# Patient Record
Sex: FEMALE | Race: BLACK OR AFRICAN AMERICAN | NOT HISPANIC OR LATINO | URBAN - METROPOLITAN AREA
[De-identification: names, ages, dates, MRNs, and addresses within clinical notes are randomized per-mention and may not be internally consistent; named-entity substitution may affect disease eponyms.]

---

## 2020-12-09 ENCOUNTER — IMPORTED ENCOUNTER (OUTPATIENT)
Dept: URBAN - METROPOLITAN AREA CLINIC 1 | Facility: CLINIC | Age: 70
End: 2020-12-09

## 2020-12-09 PROBLEM — H26.493: Noted: 2020-12-09

## 2020-12-09 PROBLEM — Z96.1: Noted: 2020-12-09

## 2020-12-09 PROBLEM — H40.1131: Noted: 2020-12-09

## 2020-12-09 PROCEDURE — 92133 CPTRZD OPH DX IMG PST SGM ON: CPT

## 2020-12-09 PROCEDURE — 92020 GONIOSCOPY: CPT

## 2020-12-09 PROCEDURE — 92004 COMPRE OPH EXAM NEW PT 1/>: CPT

## 2020-12-09 NOTE — PATIENT DISCUSSION
1.  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 IOP 29/19 today. Cont Brimonidine BID OU Dozolamide-Timolol BID OD Latanoprost QHS OU. D/c Dorzolamide 2%. Patient advised to be compliant with gtts. Condition was discussed with patient and patient understands. Will continue to monitor patient for any progression in condition. Patient was advised to call us with any problems questions or concerns. Gonio today OU: OD shows broad PAS in the inferior and nasal angle w/ 1 TM pigment and a patent superior LPI OS shows broad PAS in the superior and temporal angle w/ trace TM pigment and a patent superior LPI. ***Consider bearveldt tube shunt in future if IOP OD elevated. 2. PCO OU: (Posterior Capsule Opacification):  Observe and consider yag cap when pt feels pco visually significant and visual acuity decreases to appropriate level. 3. Pseudophakia OU - done elsewhere4. H/o Chronic iritis - Quiet today OU. Pt states currently taking Pred BID OD. Recommend Pred TID OD x 1 week then BID OD x 1 week the Qdaily OD x 1 week if iritis remain quiet. ***Consider d/c'ing Pred at next visit if iritis quiet. Patient deferred Manifest Rx today. Return for an appointment in 3 weeks 10 with Dr. Jackie Cisneros.

## 2020-12-30 ENCOUNTER — IMPORTED ENCOUNTER (OUTPATIENT)
Dept: URBAN - METROPOLITAN AREA CLINIC 1 | Facility: CLINIC | Age: 70
End: 2020-12-30

## 2020-12-30 PROBLEM — H40.1131: Noted: 2020-12-30

## 2020-12-30 PROCEDURE — 92012 INTRM OPH EXAM EST PATIENT: CPT

## 2020-12-30 NOTE — PATIENT DISCUSSION
1.  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 IOP 39/15 today. Cont Brimonidine BID OU Dozolamide-Timolol BID OD Latanoprost QHS OU. Patient advised to be compliant with gtts. Condition was discussed with patient and patient understands. Will continue to monitor patient for any progression in condition. Patient was advised to call us with any problems questions or concerns. Discussed the  risks benefits and details of a bearveldt tube shunt procedure OD. Pt wished to proceed with bearveldt tube shunt. Will schedule bearveldt tube shunt procedure for OD only. 2.  PCO OU: (Posterior Capsule Opacification):  Observe and consider yag cap when pt feels pco visually significant and visual acuity decreases to appropriate level. 3. Pseudophakia OU - done elsewhere4. H/o Chronic iritis - Quiet today OU. Taper Pred to BID OU. ***Consider tapering further in the future if iritis remains quietReturn for an appointment in [address] tube shunt procedure OD with Dr. Daysi Meek.

## 2021-01-13 ENCOUNTER — IMPORTED ENCOUNTER (OUTPATIENT)
Dept: URBAN - METROPOLITAN AREA CLINIC 1 | Facility: CLINIC | Age: 71
End: 2021-01-13

## 2021-01-13 PROBLEM — H20.011: Noted: 2021-01-13

## 2021-01-13 PROCEDURE — 92012 INTRM OPH EXAM EST PATIENT: CPT

## 2021-01-13 NOTE — PATIENT DISCUSSION
1.  Chronic Iritis OU OD>OS -- Cells noted OD on Pred TID OD. Given IOP increased decrease Pred BID OD until patient seen. 2.  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 Cont Brimonidine BID OU Latanoprost QHS OU and  Dozolamide-Timolol BID OD Only. Patient advised to be compliant with gtts. Condition was discussed with patient and patient understands. Will continue to monitor patient for any progression in condition. Patient was advised to call us with any problems questions or concerns. Discussed the  risks benefits and details of a Bearveldt tube shunt procedure OD. Pt wished to proceed with Bearveldt tube shunt. Will schedule Bearveldt tube shunt procedure for OD only. 3.  PCO OU -- (Posterior Capsule Opacification):  Observe and consider yag cap when pt feels pco visually significant and visual acuity decreases to appropriate level. 4. Pseudophakia OU -- Done elsewhere. Return for an appointment in [place] tube shunt procedure OD with Dr. Aaron Felder.

## 2021-01-27 ENCOUNTER — IMPORTED ENCOUNTER (OUTPATIENT)
Dept: URBAN - METROPOLITAN AREA CLINIC 1 | Facility: CLINIC | Age: 71
End: 2021-01-27

## 2021-01-27 PROBLEM — H40.41X1: Noted: 2021-01-27

## 2021-01-27 NOTE — PATIENT DISCUSSION
Mild Uveitic Glaucoma OD (CD 0.20)- IOP remains elevated despite compliance and maximum gtt therapy. Will proceed with Baerveldt Tube Shunt OD as scheduled. Pros cons risks and benefits discussed. Cont Brimonidine BID OU Latanoprost QHS OU and  Dozolamide-Timolol BID OD Only. Return for an appointment for Return as scheduled with Dr. Mary Velázquez.

## 2021-02-10 ENCOUNTER — IMPORTED ENCOUNTER (OUTPATIENT)
Dept: URBAN - METROPOLITAN AREA CLINIC 1 | Facility: CLINIC | Age: 71
End: 2021-02-10

## 2021-02-11 ENCOUNTER — IMPORTED ENCOUNTER (OUTPATIENT)
Dept: URBAN - METROPOLITAN AREA CLINIC 1 | Facility: CLINIC | Age: 71
End: 2021-02-11

## 2021-02-11 PROCEDURE — 99024 POSTOP FOLLOW-UP VISIT: CPT

## 2021-02-11 NOTE — PATIENT DISCUSSION
POD#1 Baerveldt Shunt OD - (PVM)  Continue Pred QID OD Ofloxacin QID OD and Maxitrol QHS OD. Restart Dorzolamide Timolol TID OD and Brimonodine TID OD. Begin Diamox 250mg 1 PO BID. Return for an appointment in tomorrow po with Dr. Elliott Avalos.

## 2021-02-12 ENCOUNTER — IMPORTED ENCOUNTER (OUTPATIENT)
Dept: URBAN - METROPOLITAN AREA CLINIC 1 | Facility: CLINIC | Age: 71
End: 2021-02-12

## 2021-02-12 PROCEDURE — 99024 POSTOP FOLLOW-UP VISIT: CPT

## 2021-02-12 NOTE — PATIENT DISCUSSION
POD#2 Baerveldt Shunt OD - (PVM) IOP 22/8 today. Continue Pred QID OD Ofloxacin QID OD and Maxitrol QHS OD. Decrease Dorzolamide Timolol to BID OD. Cont Diamox 250mg PO BID. Cont Latanoprost QHS OU. Use Brimonodine BID OU. Return for an appointment next Wednesday for a PO with Dr. Angela Davidson.

## 2021-02-17 ENCOUNTER — IMPORTED ENCOUNTER (OUTPATIENT)
Dept: URBAN - METROPOLITAN AREA CLINIC 1 | Facility: CLINIC | Age: 71
End: 2021-02-17

## 2021-02-17 PROCEDURE — 99024 POSTOP FOLLOW-UP VISIT: CPT

## 2021-02-17 NOTE — PATIENT DISCUSSION
POW#1 Baerveldt Shunt OD - (PVM) Doing well. IOP stable. Microhyphema improving. DC Ofloxacin. Continue Prednisolone QID OD. Continue Dorzolamide Timolol BID OSContinue Brimonidine BID OU and Latanoprost QHS OUReturn for an appointment in 2 week po with Dr. Argentina Ramirez.  (Discuss removal of stitch at next visit)

## 2021-03-03 ENCOUNTER — IMPORTED ENCOUNTER (OUTPATIENT)
Dept: URBAN - METROPOLITAN AREA CLINIC 1 | Facility: CLINIC | Age: 71
End: 2021-03-03

## 2021-03-03 PROCEDURE — 99024 POSTOP FOLLOW-UP VISIT: CPT

## 2021-03-03 NOTE — PATIENT DISCUSSION
POW#2 Baerveldt Shunt OD- (PVM) Doing well. IOP today at 37/17 Pressure after stitched removed are 10/17. Atropine instilled in office OD. Microhyphema improving. Removed stitches OD. D/c Brimonidine BID OD and D/c Latanoprost QHS OD. Continue Prednisolone QID OD. Continue Dorzolamide Timolol BID OSContinue Brimonidine BID OS and Latanoprost QHS OSContiune Ofloxacin QID OD. Return for an appointment in 1 wk po with Dr. Collins Dimas.

## 2021-03-04 ENCOUNTER — IMPORTED ENCOUNTER (OUTPATIENT)
Dept: URBAN - METROPOLITAN AREA CLINIC 1 | Facility: CLINIC | Age: 71
End: 2021-03-04

## 2021-03-04 PROCEDURE — 99024 POSTOP FOLLOW-UP VISIT: CPT

## 2021-03-04 NOTE — PATIENT DISCUSSION
POW#2 Baerveldt Shunt OD- (PVM)  Patient sneezed this morning likely resulting in choroidal effusions. Appears Zheng negative. 1 gtt Atropine 1% instilled. Continue Pred QID OD and Ofloxacin QID OD Continue Maxitrol QHS OD Continue Dorzolamide Timolol BID OSContinue Brimonidine BID OS and Latanoprost QHS OSAdvised no strenuous activity Return for an appointment in tomorrow po with Dr. Melia Salmerno.

## 2021-03-05 ENCOUNTER — IMPORTED ENCOUNTER (OUTPATIENT)
Dept: URBAN - METROPOLITAN AREA CLINIC 1 | Facility: CLINIC | Age: 71
End: 2021-03-05

## 2021-03-05 PROCEDURE — 99024 POSTOP FOLLOW-UP VISIT: CPT

## 2021-03-05 NOTE — PATIENT DISCUSSION
POW#2 Baerveldt Shunt OD- (PVM) Appears Zheng negative. Pros cons risks and benefits of anterior chamber reformation discussed with patient. Pt understands and wishes to proceed. Verbal consent given. Attempted anterior chamber reformation OD but unsuccessful. Begin Atropine QID OD (sample given). Discussed possible choroidal drainage. Continue Pred QID OD and Ofloxacin QID OD Continue Maxitrol QHS OD Continue Dorzolamide Timolol BID OSContinue Brimonidine BID OS and Latanoprost QHS OSAdvised no strenuous activity. Return for an appointment next Wednesday or Firday PO with Dr. Bishnu Saleem.

## 2021-03-10 ENCOUNTER — IMPORTED ENCOUNTER (OUTPATIENT)
Dept: URBAN - METROPOLITAN AREA CLINIC 1 | Facility: CLINIC | Age: 71
End: 2021-03-10

## 2021-03-10 PROCEDURE — 99024 POSTOP FOLLOW-UP VISIT: CPT

## 2021-03-10 NOTE — PATIENT DISCUSSION
POW#2 Baerveldt Shunt OD - (PVM) PO choroidals w/ flat AC requiring reformation with tube ligation and choroidal drainage. Discussed case with Dr. Nisha Quiroga who will see patient today for possible emergency choroidal drainage and ligature of shunt. Remain off Atropine. Continue Pred QID OD Continue Maxitrol QHS OD Continue Dorzolamide Timolol BID OSContinue Brimonidine BID OS and Latanoprost QHS OSAdvised no strenuous activity.

## 2021-03-16 ENCOUNTER — IMPORTED ENCOUNTER (OUTPATIENT)
Dept: URBAN - METROPOLITAN AREA CLINIC 1 | Facility: CLINIC | Age: 71
End: 2021-03-16

## 2021-03-16 PROCEDURE — 99024 POSTOP FOLLOW-UP VISIT: CPT

## 2021-03-16 NOTE — PATIENT DISCUSSION
POM#1 Baerveldt Shunt OD - (PVM) S/p AC reformation with tube ligation and choroidal drainage Taylor Ingram). IOP improved. DC Atropine Decrease Pred Q4H OD Decrease Maxitrol QHS OD Continue Dorzolamide Timolol BID OUContinue Brimonidine BID OU and Latanoprost QHS OUAdvised no strenuous activity. Return for an appointment in 2 weeks po with Dr. Catalina Dumont.

## 2021-03-31 ENCOUNTER — IMPORTED ENCOUNTER (OUTPATIENT)
Dept: URBAN - METROPOLITAN AREA CLINIC 1 | Facility: CLINIC | Age: 71
End: 2021-03-31

## 2021-03-31 PROCEDURE — 99024 POSTOP FOLLOW-UP VISIT: CPT

## 2021-03-31 NOTE — PATIENT DISCUSSION
POM#1 Baerveldt Shunt OD - (PVM) S/p AC reformation with tube ligation and choroidal drainage Padmini Dee). IOP 34 OD today (rechecked and confirmed by PVM). Decrease Pred BID OD Cont Maxitrol QHS OD PRN (pt ok to d/c). Continue Dorzolamide Timolol BID OD onlyContinue Brimonidine BID OUUse Latanoprost QHS OU (pt was only using OS at today's visit). Advised ATs OD PRN. Gtt instruction sheet given. Advised no strenuous activity. Return for an appointment in 2 weeks PO with Dr. Odalis Andrews.

## 2021-04-02 ENCOUNTER — IMPORTED ENCOUNTER (OUTPATIENT)
Dept: URBAN - METROPOLITAN AREA CLINIC 1 | Facility: CLINIC | Age: 71
End: 2021-04-02

## 2021-04-02 PROCEDURE — 99024 POSTOP FOLLOW-UP VISIT: CPT

## 2021-04-02 NOTE — PATIENT DISCUSSION
POM#1 Baerveldt Shunt OD - (PVM) S/p AC reformation with tube ligation and choroidal drainage Jak Gutierrez). AC inflammation present on today's exam. IOP improved. Increase Pred Q2H today then decrease QID OD x 2 days then decrease TID OD until seen Begin Prolensa QD-BID OD (sample given) Continue Dorzolamide Timolol BID OD onlyContinue Brimonidine BID OUContinue Latanoprost QHS OU Advised ATs OD PRN. Advised no strenuous activity. Return for an appointment for Return as scheduled with Dr. Angela Davidson.

## 2021-04-14 ENCOUNTER — IMPORTED ENCOUNTER (OUTPATIENT)
Dept: URBAN - METROPOLITAN AREA CLINIC 1 | Facility: CLINIC | Age: 71
End: 2021-04-14

## 2021-04-14 PROCEDURE — 99024 POSTOP FOLLOW-UP VISIT: CPT

## 2021-04-14 NOTE — PATIENT DISCUSSION
1. POM#1 Baerveldt Shunt OD - (PVM) Doing Well. S/p AC reformation with tube ligation and choroidal drainage Edy Rollins). AC quiet today. IOP stable. Decrease Prednisolone BID OD Continue Dorzolamide Timolol BID OD onlyContinue Brimonidine BID OUContinue Latanoprost QHS OU Advised ATs OD PRN. Advised no strenuous activity. Return for an appointment in 2 weeks po with Dr. Chari Uribe.

## 2021-04-28 ENCOUNTER — IMPORTED ENCOUNTER (OUTPATIENT)
Dept: URBAN - METROPOLITAN AREA CLINIC 1 | Facility: CLINIC | Age: 71
End: 2021-04-28

## 2021-04-28 PROCEDURE — 99024 POSTOP FOLLOW-UP VISIT: CPT

## 2021-04-28 NOTE — PATIENT DISCUSSION
1. POM#2 Baerveldt Shunt OD - (PVM) Doing Well. S/p AC reformation with tube ligation and choroidal drainage Taylor Ingram). AC quiet today. IOP 34 today. Decrease Prednisolone to Qdaily OD x 1 week then d/c. Continue Dorzolamide Timolol BID OD onlyContinue Brimonidine BID OUContinue Latanoprost QHS OU Advised ATs OD PRN. Advised no strenuous activity. Return for an appointment in 2 weeks PO with Dr. Catalina Dumont.

## 2021-05-12 ENCOUNTER — IMPORTED ENCOUNTER (OUTPATIENT)
Dept: URBAN - METROPOLITAN AREA CLINIC 1 | Facility: CLINIC | Age: 71
End: 2021-05-12

## 2021-05-12 PROCEDURE — 99024 POSTOP FOLLOW-UP VISIT: CPT

## 2021-05-12 NOTE — PATIENT DISCUSSION
1. POM#3 Baerveldt Shunt OD - (PVM) Doing Well. S/p AC reformation with tube ligation and choroidal drainage Ortiz Aquino). AC quiet today. IOP improved 26 OD. Continue Prednisolone to Qdaily OD Continue Dorzolamide Timolol BID OD onlyContinue Brimonidine BID OUContinue Latanoprost QHS OU Advised ATs OD PRN. Advised no strenuous activity. Return for an appointment in 2 months po/MRX  with Dr. Olaf Choudhary.

## 2021-07-16 ENCOUNTER — IMPORTED ENCOUNTER (OUTPATIENT)
Dept: URBAN - METROPOLITAN AREA CLINIC 1 | Facility: CLINIC | Age: 71
End: 2021-07-16

## 2021-07-16 PROCEDURE — 99213 OFFICE O/P EST LOW 20 MIN: CPT

## 2021-07-16 NOTE — PATIENT DISCUSSION
1.  Mild Open Angle Glaucoma OU (CD 0.30/0.10) - Doing s/p Baerveldt Shunt OD. S/p AC reformation with tube ligation and choroidal drainage Danni Pelayo). AC quiet today. IOP improved 26 OD. Continue Prednisolone to Qdaily OD Dorzolamide Timolol BID OD only Brimonidine BID OU and Latanoprost QHS OU. Patient advised to be compliant with gtts. Condition was discussed with patient and patient understands. Will continue to monitor patient for any progression in condition. Patient was advised to call us with any problems questions or concerns. 2.  Chronic Iritis OU: AC Quiet OU. Continue Prednisolone to Qdaily OD 3. PCO OU: (Posterior Capsule Opacification)   Observe and consider yag cap when pt feels pco visually significant and visual acuity decreases to appropriate level. 4. Pseudophakia OU - (Done Elsewhere)Return for an appointment in 3 months 27 with Dr. Amelia Huff.

## 2021-07-30 PROBLEM — H26.493: Noted: 2021-07-30

## 2021-07-30 PROBLEM — Z96.1: Noted: 2021-07-30

## 2021-07-30 PROBLEM — H20.13: Noted: 2021-07-30

## 2021-07-30 PROBLEM — H40.1131: Noted: 2021-07-30

## 2021-11-03 ENCOUNTER — IMPORTED ENCOUNTER (OUTPATIENT)
Dept: URBAN - METROPOLITAN AREA CLINIC 1 | Facility: CLINIC | Age: 71
End: 2021-11-03

## 2021-11-03 PROBLEM — H20.13: Noted: 2021-11-03

## 2021-11-03 PROBLEM — H40.1131: Noted: 2021-11-03

## 2021-11-03 PROBLEM — H26.493: Noted: 2021-11-03

## 2021-11-03 PROBLEM — Z96.1: Noted: 2021-11-03

## 2021-11-03 PROCEDURE — 92015 DETERMINE REFRACTIVE STATE: CPT

## 2021-11-03 PROCEDURE — 99214 OFFICE O/P EST MOD 30 MIN: CPT

## 2021-11-03 NOTE — PATIENT DISCUSSION
1.  Mild Open Angle Glaucoma OU (CD 0.20 OU) - IOP stable  Continue Prednisolone to Qdaily OD Dorzolamide Timolol BID OD only Brimonidine BID OU and Latanoprost QHS OU. S/p Baerveldt Shunt OD. S/p AC reformation with tube ligation and choroidal drainage Damaso Po). AC quiet today. Patient advised to be compliant with gtts. Condition was discussed with patient and patient understands. Will continue to monitor patient for any progression in condition. Patient was advised to call us with any problems questions or concerns. 2.  Chronic Iritis OU: AC Quiet OU. Continue Prednisolone to Qdaily OD 3. PCO OU: (Posterior Capsule Opacification)   Observe and consider yag cap when pt feels pco visually significant and visual acuity decreases to appropriate level. 4. Pseudophakia OU - (Done Elsewhere)  MRX for glasses given. Return for an appointment in 4 month 10/OCT with Dr. Aaron Felder.

## 2022-02-14 ENCOUNTER — APPOINTMENT (OUTPATIENT)
Dept: PHYSICAL THERAPY | Age: 72
End: 2022-02-14

## 2022-02-16 ENCOUNTER — HOSPITAL ENCOUNTER (OUTPATIENT)
Dept: PHYSICAL THERAPY | Age: 72
Discharge: HOME | End: 2022-02-16
Payer: MEDICARE

## 2022-02-16 PROCEDURE — 97110 THERAPEUTIC EXERCISES: CPT

## 2022-02-16 PROCEDURE — 97535 SELF CARE MNGMENT TRAINING: CPT

## 2022-02-16 PROCEDURE — 97162 PT EVAL MOD COMPLEX 30 MIN: CPT

## 2022-02-16 NOTE — PROGRESS NOTES
[address] [facility] PHYSICAL THERAPY   [facility] [address],[facility], [address] - Phone: [phone]  Fax: [phone] / [address]  Patient Name: Louie Bear : 1950   Medical   Diagnosis: Pain in right shoulder [M25.511]  Impingement syndrome of right shoulder [M75.41] Treatment Diagnosis: R shoulder pain   Onset Date: Chronic     Referral Source: Gurvinder Liriano MD Start of [facility]): 2022   Prior Hospitalization: See medical history Provider #: [id number]   Prior Level of Function: Chronic pain with overhead reach   Comorbidities: Diabetes, arthritis, HTN, tobacco use, asthma, Vertigo   Medications: Verified on Patient Summary List   The Plan of Care and following information is based on the information from the initial evaluation.   ===========================================================================================  Assessment / dunlap information:  Louie Bear is a 80 y.o. female with Dx: Chronic R shoulder pain that has gotten worse over time. X-rays recently that showed \"a rotator cuff thing\". Pt also had an injection last week to help pain levels with PT. Denies N/T. Reports all movement hurts R shoulder causing difficulty with all ADLs and self care. Reports she wants to do PT for a few weeks to see if it helps and will follow up with ortho after PT. Pt rates pain as 10/10 max, 6/10 at best, 6/10 currently. Objective: FOTO score = 46 (an established functional score where 100 = no disability). Posture: forward head and rounded shoulders. Palpation TTP along R UT, levator, infraspinatus, supraspinatus. Shoulder ROM Flexion R 128 with pain L 159, ABD R 142 with pain L 171, Functional IR R L1 with pain L T8, Functional ER R C7 with pain L T5. PROM R shoulder WNL.  Strength: Shoulder Flexion R 4/5 L NT due to pain, ABD R 4/5 L NT due to pain, ER R 3+/5 with pain L 4-/5, IR R 3+/5 with pain L 4/5. Special Tests: (+) gupta teresa, (+) painful arc  Current deficits include decreased AROM and strength in RUE causing difficulties with ADLs and self care. Pt will benefit from a comprehensive POC/HEP to address impairments and restore function in order to return to prior level of function and prevent secondary impairments.  ===========================================================================================  Eval Complexity: History HIGH Complexity :3+ comorbidities / personal factors will impact the outcome/ POC ;  Examination  HIGH Complexity : 4+ Standardized tests and measures addressing body structure, function, activity limitation and / or participation in recreation ; Presentation MEDIUM Complexity : Evolving with changing characteristics ; Decision Making MEDIUM Complexity : FOTO score of 26-74; Overall Complexity MEDIUM  Problem List: pain affecting function, decrease ROM, decrease strength, decrease ADL/ functional abilitiies, decrease activity tolerance, decrease flexibility/ joint mobility, and decrease transfer abilities   Treatment Plan may include any combination of the following: Therapeutic exercise, Therapeutic activities, Neuromuscular re-education, Physical agent/modality, Gait/balance training, Manual therapy, Aquatic therapy, Patient education, Self Care training, Functional mobility training, and Home safety training  Patient / Family readiness to learn indicated by: asking questions, trying to perform skills, and interest  Persons(s) to be included in education: patient (P)  Barriers to Learning/Limitations: None  Measures taken, if barriers to learning:    Patient Goal (s): No pain   Patient self reported health status: good  Rehabilitation Potential: good   Short Term Goals: To be accomplished in  3  weeks:  1. Independent with HEP to progress to meet goals.   2. Pt to report decreased max pain levels less than 8/10 for improvement in ADLs.   Long Term Goals: To be accomplished in  6  weeks:  1. Improve FOTO score to 61/100 to show a significant functional change. 2. Pt to report decreased max pain levels less than 5/10 for improvement in QoL. 3. Pt to demonstrate 140 degrees of R arm elevation for improvement in ADLs. Frequency / Duration:   Patient to be seen  2  times per week for 6  weeks:  Patient / Caregiver education and instruction: self care, activity modification, and exercises  Therapist Signature: Kiah Pineda PT , DPT Date: 5/72/7974   Certification Period: 2/16/22-5/12/22 Time: 10:59 AM   ===========================================================================================  I certify that the above Physical Therapy Services are being furnished while the patient is under my care. I agree with the treatment plan and certify that this therapy is necessary. Physician Signature:        Date:       Time:                                        Antony Morse MD  Please sign and return to [facility] at [facility] [place], [facility]. or you may fax the signed copy to [phone]. Thank you.

## 2022-02-16 NOTE — PROGRESS NOTES
PHYSICAL THERAPY - DAILY TREATMENT NOTE    Patient Name: Marley Dalton        Date: 2022  : 1950   yes Patient  Verified  Visit #:      of   12  Insurance: Payor: Lorena Gonsalez / Plan: [employer] / Product Type: Managed Care Medicare /      In time: 348 Out time: 1007   Total Treatment Time: 37     Medicare/BCBS Time Tracking (below)   Total Timed Codes (min):  23 1:1 Treatment Time:  37     TREATMENT AREA =  Pain in right shoulder [M25.511]  Impingement syndrome of right shoulder [M75.41]    SUBJECTIVE  Pain Level (on 0 to 10 scale):  6   10   Medication Changes/New allergies or changes in medical history, any new surgeries or procedures? yes  If yes, update Summary List   Subjective Functional Status/Changes:  []  No changes reported     See POC          OBJECTIVE  10 min Therapeutic Exercise:  [x]  See flow sheet   Rationale:      increase strength and improve coordination to improve the patients ability to perform ADLs. 13 min Self Care: Pt education on HEP, POC, prognosis, and diagnosis. Rationale:    Improve pt understanding to improve the patients ability to progress therapy at home. Billed With/As:   [] TE   [] TA   [] Neuro   [x] Self Care Patient Education: [x] Review HEP    [] Progressed/Changed HEP based on:   [] positioning   [] body mechanics   [] transfers   [] heat/ice application    [] other:      Other Objective/Functional Measures:    Shown and performed HEP    Access Code: [email or web address]  URL: [email or web address]  Date: 2022  Prepared by: Randall Boston    Exercises  Seated Scapular Retraction - 2 x daily - 7 x weekly - 2 sets - 10 reps  Standing Isometric Shoulder Internal Rotation at Doorway - 2 x daily - 7 x weekly - 10 reps - 5sec hold  Isometric Shoulder External Rotation at Wall - 2 x daily - 7 x weekly - 10 reps - 5sec hold  Seated Levator Scapulae Stretch - 2 x daily - 7 x weekly - 3 sets - 20sec hold Post Treatment Pain Level (on 0 to 10) scale:   6  / 10     ASSESSMENT  Assessment/Changes in Function:     See POC     []  See Progress Note/Recertification   Patient will continue to benefit from skilled PT services to modify and progress therapeutic interventions, address functional mobility deficits, analyze and cue movement patterns, and analyze and modify body mechanics/ergonomics to attain remaining goals.    Progress toward goals / Updated goals:    See POC     PLAN  [x]  Upgrade activities as tolerated yes Continue plan of care   []  Discharge due to :    []  Other:      Therapist: Aysha Castro, PT , DPT    Date: 2/16/2022 Time: 7:58 AM     Future Appointments   Date Time Provider Brie Ernst   2/16/2022  9:30 AM Barabara Goldberg, PT [phone]

## 2022-02-18 NOTE — PROGRESS NOTES
[address] [facility] THERAPY  [address] [place], [facility], [address] - Phone: [phone]  Fax: [phone]    DISCHARGE NOTE  Patient Name: Marley Dalton : 1950   Treatment/Medical Diagnosis: Pain in right shoulder [M25.511]  Impingement syndrome of right shoulder [M75.41]   Referral Source: Rosas Waller MD     Date of Initial Visit: 22 Attended Visits: 1 Missed Visits: 0       SUMMARY OF TREATMENT  Pt attended only initial evaluation and     0     follow-ups and then did not return due to financial reasons. Therefore a formal reassessment of goals was not performed. RECOMMENDATIONS  Discontinue physical therapy due to patient not returning. If you have any questions/comments please contact us directly at [phone]. Thank you for allowing us to assist in the care of your patient.     Therapist Signature: Randall Boston PT, DPT Date: 22     Time: 10:37 AM

## 2022-02-22 ENCOUNTER — APPOINTMENT (OUTPATIENT)
Dept: PHYSICAL THERAPY | Age: 72
End: 2022-02-22
Payer: MEDICARE

## 2022-02-24 ENCOUNTER — APPOINTMENT (OUTPATIENT)
Dept: PHYSICAL THERAPY | Age: 72
End: 2022-02-24
Payer: MEDICARE

## 2022-03-01 ENCOUNTER — APPOINTMENT (OUTPATIENT)
Dept: PHYSICAL THERAPY | Age: 72
End: 2022-03-01

## 2022-03-01 ENCOUNTER — PREPPED CHART (OUTPATIENT)
Dept: URBAN - METROPOLITAN AREA CLINIC 2 | Facility: CLINIC | Age: 72
End: 2022-03-01

## 2022-03-01 NOTE — PATIENT DISCUSSION
(CD 0.20 OU). Patient advised to be compliant with gtts. Condition was discussed with patient and patient understands. Will continue to monitor patient for any progression in condition. Patient was advised to call us with any problems, questions, or concerns.

## 2022-03-02 ENCOUNTER — FOLLOW UP (OUTPATIENT)
Dept: URBAN - METROPOLITAN AREA CLINIC 2 | Facility: CLINIC | Age: 72
End: 2022-03-02

## 2022-03-02 DIAGNOSIS — Z96.1: ICD-10-CM

## 2022-03-02 DIAGNOSIS — H40.1131: ICD-10-CM

## 2022-03-02 DIAGNOSIS — H20.13: ICD-10-CM

## 2022-03-02 DIAGNOSIS — H26.493: ICD-10-CM

## 2022-03-02 PROCEDURE — 92012 INTRM OPH EXAM EST PATIENT: CPT

## 2022-03-02 PROCEDURE — 92133 CPTRZD OPH DX IMG PST SGM ON: CPT

## 2022-03-02 ASSESSMENT — TONOMETRY
OS_IOP_MMHG: 18
OD_IOP_MMHG: 18

## 2022-03-02 ASSESSMENT — VISUAL ACUITY
OD_CC: 20/20+2
OS_CC: 20/20

## 2022-03-02 NOTE — PATIENT DISCUSSION
(CD 0.30 OU). S/p Baerveldt Shunt OD. S/p Ripchord stent removal. Continue Pred QD OD. Continue Brimonidine BID OU, Dorzolamide-Timolol BID OD, and Latanoprost QHS OU. Consider DC one gtt if IOP adequate at next visit. Patient advised to be compliant with gtts. Condition was discussed with patient and patient understands. Will continue to monitor patient for any progression in condition. Patient was advised to call us with any problems, questions, or concerns.

## 2022-03-04 ENCOUNTER — APPOINTMENT (OUTPATIENT)
Dept: PHYSICAL THERAPY | Age: 72
End: 2022-03-04

## 2022-03-07 ENCOUNTER — APPOINTMENT (OUTPATIENT)
Dept: PHYSICAL THERAPY | Age: 72
End: 2022-03-07

## 2022-03-10 ENCOUNTER — APPOINTMENT (OUTPATIENT)
Dept: PHYSICAL THERAPY | Age: 72
End: 2022-03-10

## 2022-03-14 ENCOUNTER — APPOINTMENT (OUTPATIENT)
Dept: PHYSICAL THERAPY | Age: 72
End: 2022-03-14

## 2022-03-14 ENCOUNTER — TELEPHONE (OUTPATIENT)
Dept: PHYSICAL THERAPY | Age: 72
End: 2022-03-14

## 2022-03-14 ENCOUNTER — HOSPITAL ENCOUNTER (OUTPATIENT)
Dept: PHYSICAL THERAPY | Age: 72
End: 2022-03-14

## 2022-03-16 ENCOUNTER — APPOINTMENT (OUTPATIENT)
Dept: PHYSICAL THERAPY | Age: 72
End: 2022-03-16

## 2022-03-21 ENCOUNTER — APPOINTMENT (OUTPATIENT)
Dept: PHYSICAL THERAPY | Age: 72
End: 2022-03-21

## 2022-03-23 ENCOUNTER — APPOINTMENT (OUTPATIENT)
Dept: PHYSICAL THERAPY | Age: 72
End: 2022-03-23

## 2022-03-28 ENCOUNTER — APPOINTMENT (OUTPATIENT)
Dept: PHYSICAL THERAPY | Age: 72
End: 2022-03-28

## 2022-03-30 ENCOUNTER — APPOINTMENT (OUTPATIENT)
Dept: PHYSICAL THERAPY | Age: 72
End: 2022-03-30

## 2022-04-03 ASSESSMENT — VISUAL ACUITY
OD_PH: SC 20/25 -1
OD_CC: 20/50-3
OS_CC: 20/20-1
OD_CC: 20/25+1
OS_CC: 20/20-1
OS_CC: J1+
OS_CC: 20/20
OS_CC: 20/20
OD_CC: J1+
OS_CC: 20/20
OD_CC: 20/40-1
OD_PH: SC 20/25
OS_CC: 20/20
OD_CC: 20/40
OD_CC: CF@2'
OD_CC: 20/30-1
OS_CC: 20/20
OS_CC: 20/20
OD_CC: 20/100
OD_CC: 20/150
OD_CC: 20/25
OS_CC: 20/20
OD_PH: SC 20/30
OS_CC: 20/25
OD_CC: 20/20
OS_CC: 20/20
OS_CC: 20/20
OD_CC: 20/40
OS_CC: 20/20
OS_CC: 20/20
OD_CC: 20/20
OD_CC: 20/30+2
OS_CC: 20/20
OD_GLARE: 20/80
OD_CC: 20/40
OS_CC: 20/20
OS_CC: 20/20
OD_CC: 20/40+2
OS_GLARE: 20/50

## 2022-04-03 ASSESSMENT — TONOMETRY
OS_IOP_MMHG: 20
OD_IOP_MMHG: 18
OD_IOP_MMHG: 1
OD_IOP_MMHG: 25
OS_IOP_MMHG: 12
OD_IOP_MMHG: 16
OS_IOP_MMHG: 10
OS_IOP_MMHG: 19
OD_IOP_MMHG: 51
OD_IOP_MMHG: 39
OS_IOP_MMHG: 8
OD_IOP_MMHG: 20
OD_IOP_MMHG: 26
OS_IOP_MMHG: 14
OS_IOP_MMHG: 19
OD_IOP_MMHG: 24
OD_IOP_MMHG: 21
OS_IOP_MMHG: 20
OD_IOP_MMHG: 10
OD_IOP_MMHG: 43
OD_IOP_MMHG: 34
OD_IOP_MMHG: 34
OD_IOP_MMHG: 29
OD_IOP_MMHG: 22
OD_IOP_MMHG: 44
OD_IOP_MMHG: 22
OS_IOP_MMHG: 17
OS_IOP_MMHG: 18
OS_IOP_MMHG: 19
OD_IOP_MMHG: 37
OS_IOP_MMHG: 18
OS_IOP_MMHG: 15

## 2022-07-06 ENCOUNTER — COMPREHENSIVE EXAM (OUTPATIENT)
Dept: URBAN - METROPOLITAN AREA CLINIC 2 | Facility: CLINIC | Age: 72
End: 2022-07-06

## 2022-07-06 DIAGNOSIS — E11.9: ICD-10-CM

## 2022-07-06 DIAGNOSIS — H02.834: ICD-10-CM

## 2022-07-06 DIAGNOSIS — H02.831: ICD-10-CM

## 2022-07-06 DIAGNOSIS — H40.1131: ICD-10-CM

## 2022-07-06 DIAGNOSIS — H04.123: ICD-10-CM

## 2022-07-06 DIAGNOSIS — H26.493: ICD-10-CM

## 2022-07-06 DIAGNOSIS — H16.143: ICD-10-CM

## 2022-07-06 DIAGNOSIS — H20.13: ICD-10-CM

## 2022-07-06 DIAGNOSIS — Z96.1: ICD-10-CM

## 2022-07-06 PROCEDURE — 99214 OFFICE O/P EST MOD 30 MIN: CPT

## 2022-07-06 PROCEDURE — 92083 EXTENDED VISUAL FIELD XM: CPT

## 2022-07-06 ASSESSMENT — VISUAL ACUITY
OD_CC: 20/20
OS_CC: J1+
OD_CC: J1+
OS_CC: 20/20

## 2022-07-06 ASSESSMENT — TONOMETRY
OS_IOP_MMHG: 12
OD_IOP_MMHG: 14

## 2022-07-06 NOTE — PATIENT DISCUSSION
(CD 0.50/0.60) HVF shows superior defects likely secondary to lids, stable. IOP stable. S/p Baerveldt Shunt OD. S/p Ripchord stent removal. Continue Pred QD OD. Continue Brimonidine BID OU, Dorzolamide-Timolol BID OD, and Latanoprost QHS OU. Consider DC one gtt if IOP adequate at next visit. Patient advised to be compliant with gtts. Condition was discussed with patient and patient understands. Will continue to monitor patient for any progression in condition. Patient was advised to call us with any problems, questions, or concerns.

## 2023-07-12 ENCOUNTER — COMPREHENSIVE EXAM (OUTPATIENT)
Dept: URBAN - METROPOLITAN AREA CLINIC 2 | Facility: CLINIC | Age: 73
End: 2023-07-12

## 2023-07-12 DIAGNOSIS — E11.9: ICD-10-CM

## 2023-07-12 DIAGNOSIS — H40.1131: ICD-10-CM

## 2023-07-12 PROCEDURE — 99214 OFFICE O/P EST MOD 30 MIN: CPT

## 2023-07-12 PROCEDURE — 92133 CPTRZD OPH DX IMG PST SGM ON: CPT

## 2023-07-12 ASSESSMENT — VISUAL ACUITY
OD_CC: 20/20-1
OU_CC: J1+
OS_CC: 20/20

## 2023-07-12 ASSESSMENT — TONOMETRY
OS_IOP_MMHG: 15
OD_IOP_MMHG: 15

## 2024-02-14 ENCOUNTER — FOLLOW UP (OUTPATIENT)
Dept: URBAN - METROPOLITAN AREA CLINIC 2 | Facility: CLINIC | Age: 74
End: 2024-02-14

## 2024-02-14 DIAGNOSIS — H40.1131: ICD-10-CM

## 2024-02-14 PROCEDURE — 92083 EXTENDED VISUAL FIELD XM: CPT

## 2024-02-14 PROCEDURE — 99213 OFFICE O/P EST LOW 20 MIN: CPT

## 2024-02-14 ASSESSMENT — VISUAL ACUITY
OD_CC: 20/20
OS_CC: 20/20

## 2024-02-14 ASSESSMENT — TONOMETRY
OS_IOP_MMHG: 14
OD_IOP_MMHG: 14

## 2024-05-29 ENCOUNTER — EMERGENCY VISIT (OUTPATIENT)
Dept: URBAN - METROPOLITAN AREA CLINIC 2 | Facility: CLINIC | Age: 74
End: 2024-05-29

## 2024-05-29 DIAGNOSIS — H04.123: ICD-10-CM

## 2024-05-29 DIAGNOSIS — H16.143: ICD-10-CM

## 2024-05-29 PROCEDURE — 99213 OFFICE O/P EST LOW 20 MIN: CPT

## 2024-05-29 ASSESSMENT — TONOMETRY
OD_IOP_MMHG: 15
OS_IOP_MMHG: 15

## 2024-05-29 ASSESSMENT — VISUAL ACUITY
OS_CC: 20/20
OD_CC: 20/20

## 2024-08-21 ENCOUNTER — COMPREHENSIVE EXAM (OUTPATIENT)
Dept: URBAN - METROPOLITAN AREA CLINIC 2 | Facility: CLINIC | Age: 74
End: 2024-08-21

## 2024-08-21 DIAGNOSIS — H16.143: ICD-10-CM

## 2024-08-21 DIAGNOSIS — H02.831: ICD-10-CM

## 2024-08-21 DIAGNOSIS — H21.543: ICD-10-CM

## 2024-08-21 DIAGNOSIS — H57.813: ICD-10-CM

## 2024-08-21 DIAGNOSIS — H26.493: ICD-10-CM

## 2024-08-21 DIAGNOSIS — H02.834: ICD-10-CM

## 2024-08-21 DIAGNOSIS — H04.123: ICD-10-CM

## 2024-08-21 DIAGNOSIS — E11.9: ICD-10-CM

## 2024-08-21 DIAGNOSIS — Z96.1: ICD-10-CM

## 2024-08-21 DIAGNOSIS — H40.1131: ICD-10-CM

## 2024-08-21 PROCEDURE — 92133 CPTRZD OPH DX IMG PST SGM ON: CPT

## 2024-08-21 PROCEDURE — 99214 OFFICE O/P EST MOD 30 MIN: CPT

## 2024-08-21 ASSESSMENT — TONOMETRY
OD_IOP_MMHG: 14
OS_IOP_MMHG: 14

## 2024-08-21 ASSESSMENT — VISUAL ACUITY
OD_CC: 20/20
OS_CC: 20/20

## 2025-01-14 NOTE — PATIENT DISCUSSION
Patient advised to call if any problems, questions, or concerns. Please see the attached refill request.

## 2025-01-15 ENCOUNTER — FOLLOW UP (OUTPATIENT)
Age: 75
End: 2025-01-15

## 2025-01-15 DIAGNOSIS — E11.9: ICD-10-CM

## 2025-01-15 DIAGNOSIS — H04.123: ICD-10-CM

## 2025-01-15 DIAGNOSIS — H57.813: ICD-10-CM

## 2025-01-15 DIAGNOSIS — H40.1131: ICD-10-CM

## 2025-01-15 DIAGNOSIS — H16.143: ICD-10-CM

## 2025-01-15 DIAGNOSIS — H26.493: ICD-10-CM

## 2025-01-15 PROCEDURE — 92083 EXTENDED VISUAL FIELD XM: CPT

## 2025-01-15 PROCEDURE — 92012 INTRM OPH EXAM EST PATIENT: CPT

## 2025-06-18 ENCOUNTER — COMPREHENSIVE EXAM (OUTPATIENT)
Age: 75
End: 2025-06-18

## 2025-06-18 DIAGNOSIS — H26.493: ICD-10-CM

## 2025-06-18 DIAGNOSIS — H57.813: ICD-10-CM

## 2025-06-18 DIAGNOSIS — H02.834: ICD-10-CM

## 2025-06-18 DIAGNOSIS — H21.543: ICD-10-CM

## 2025-06-18 DIAGNOSIS — E11.9: ICD-10-CM

## 2025-06-18 DIAGNOSIS — H02.831: ICD-10-CM

## 2025-06-18 DIAGNOSIS — H52.13: ICD-10-CM

## 2025-06-18 DIAGNOSIS — Z96.1: ICD-10-CM

## 2025-06-18 DIAGNOSIS — H40.1131: ICD-10-CM

## 2025-06-18 DIAGNOSIS — H04.123: ICD-10-CM

## 2025-06-18 DIAGNOSIS — H16.143: ICD-10-CM

## 2025-06-18 PROCEDURE — 92015 DETERMINE REFRACTIVE STATE: CPT

## 2025-06-18 PROCEDURE — 99214 OFFICE O/P EST MOD 30 MIN: CPT

## 2025-06-18 PROCEDURE — 92133 CPTRZD OPH DX IMG PST SGM ON: CPT
